# Patient Record
Sex: FEMALE | Race: WHITE | NOT HISPANIC OR LATINO | Employment: FULL TIME | URBAN - NONMETROPOLITAN AREA
[De-identification: names, ages, dates, MRNs, and addresses within clinical notes are randomized per-mention and may not be internally consistent; named-entity substitution may affect disease eponyms.]

---

## 2016-04-27 LAB — HIV 1&2 EXT: NORMAL

## 2018-12-19 ENCOUNTER — OFFICE VISIT (OUTPATIENT)
Dept: FAMILY MEDICINE | Facility: OTHER | Age: 38
End: 2018-12-19
Attending: FAMILY MEDICINE
Payer: COMMERCIAL

## 2018-12-19 VITALS
TEMPERATURE: 97.4 F | WEIGHT: 184.4 LBS | BODY MASS INDEX: 31.65 KG/M2 | DIASTOLIC BLOOD PRESSURE: 62 MMHG | SYSTOLIC BLOOD PRESSURE: 112 MMHG

## 2018-12-19 DIAGNOSIS — T14.8XXA WOUND INFECTION: Primary | ICD-10-CM

## 2018-12-19 DIAGNOSIS — L08.9 WOUND INFECTION: Primary | ICD-10-CM

## 2018-12-19 PROCEDURE — 99213 OFFICE O/P EST LOW 20 MIN: CPT | Performed by: FAMILY MEDICINE

## 2018-12-19 RX ORDER — CLINDAMYCIN HCL 300 MG
300 CAPSULE ORAL 4 TIMES DAILY
Qty: 40 CAPSULE | Refills: 0 | Status: SHIPPED | OUTPATIENT
Start: 2018-12-19 | End: 2018-12-29

## 2018-12-19 RX ORDER — CEPHALEXIN 500 MG/1
500 CAPSULE ORAL 2 TIMES DAILY
Qty: 20 CAPSULE | Refills: 0 | Status: SHIPPED | OUTPATIENT
Start: 2018-12-19

## 2018-12-19 RX ORDER — CEPHALEXIN 500 MG/1
500 CAPSULE ORAL 2 TIMES DAILY
Qty: 20 CAPSULE | Refills: 0 | Status: SHIPPED | OUTPATIENT
Start: 2018-12-19 | End: 2018-12-19

## 2018-12-19 NOTE — PROGRESS NOTES
SUBJECTIVE:   Swapna Kwong is a 38 year old female who presents to clinic today for the following health issues:    Patient is about 1 month out from surgery of her parotid gland, done at [facility].  Overall this is been healing very well.  But 4 days ago she started to notice some redness at the very inferior aspect of the incision.  She noticed some crustiness on the outside of the incision, her hair was sticking to this area.  Since then, she has been keeping it clean with hydrogen peroxide and making sure her hair stays out of this area.  No further drainage noted.  No fevers.  Redness is actually improved since that time.        Review of Systems see HPI, review of systems is otherwise negative.    OBJECTIVE:     /62 (BP Location: Right arm, Patient Position: Sitting, Cuff Size: Adult Regular)   Temp 97.4  F (36.3  C) (Tympanic)   Wt 83.6 kg (184 lb 6.4 oz)   BMI 31.65 kg/m    Body mass index is 31.65 kg/m .  Physical Exam   Constitutional: She appears well-developed and well-nourished.   HENT:   Right Ear: External ear normal.   Left Ear: External ear normal.   Overall, excellent appearance of incision behind right earlobe extending down inferior to her ear.  Very minimal amount of erythema at the very inferior portion of this incision.  There is firmness subcutaneously related to scar tissue, but no fluctuance.  No drainage.   Eyes: Conjunctivae are normal. No scleral icterus.   Cardiovascular: Normal rate.   Pulmonary/Chest: Effort normal. No respiratory distress.   Musculoskeletal: She exhibits no edema.   Neurological: She is alert.   Skin: No rash noted.   Psychiatric: She has a normal mood and affect.       ASSESSMENT/PLAN:         ICD-10-CM    1. Wound infection T14.8XXA clindamycin (CLEOCIN) 300 MG capsule    L08.9 cephALEXin (KEFLEX) 500 MG capsule     DISCONTINUED: cephALEXin (KEFLEX) 500 MG capsule     Suspect irritation/infection is actually healing on its own.   Patient will  continue to monitor for the next few days.  If redness increases, start keflex.   If symptoms worsen on Keflex, switch to Clinda due risk for MRSA. (printed prescription provided due to upcoming holiday).   Patient advised to have a low risk for returning for evaluation if symptoms persist or worsen. May need I&D, but no indication for that at this time.   She states she also has the option of virtual follow-up with [facility] if needed.    Sabi Fong MD  [facility]

## 2018-12-19 NOTE — NURSING NOTE
"Patient here for incision check. Had a surgical incision done on 12/15 at the [facility].  Incision is behind right ear, it was hurting, had drainage and was looking angry today it looks a bit better but wants to make sure.   Gissell Barnes LPN ..........12/19/2018 9:01 AM   Chief Complaint   Patient presents with     Wound Check     incision check       Initial /62 (BP Location: Right arm, Patient Position: Sitting, Cuff Size: Adult Regular)   Temp 97.4  F (36.3  C) (Tympanic)   Wt 83.6 kg (184 lb 6.4 oz)   BMI 31.65 kg/m   Estimated body mass index is 31.65 kg/m  as calculated from the following:    Height as of 11/10/16: 1.626 m (5' 4\").    Weight as of this encounter: 83.6 kg (184 lb 6.4 oz).  Medication Reconciliation: complete    Gissell Barnes LPN    "

## 2022-07-19 ENCOUNTER — APPOINTMENT (OUTPATIENT)
Dept: FAMILY MEDICINE | Facility: OTHER | Age: 42
End: 2022-07-19
Attending: CHIROPRACTOR

## 2022-07-19 PROCEDURE — 99199 UNLISTED SPECIAL SVC PX/RPRT: CPT | Performed by: CHIROPRACTOR

## 2023-07-14 ENCOUNTER — TRANSFERRED RECORDS (OUTPATIENT)
Dept: MULTI SPECIALTY CLINIC | Facility: CLINIC | Age: 43
End: 2023-07-14

## 2023-07-14 LAB
CHOLESTEROL (EXTERNAL): 228 MG/DL (ref 0–200)
HBA1C MFR BLD: 5 % (ref 4–5.6)
HDLC SERPL-MCNC: 58 MG/DL
LDL CHOLESTEROL CALCULATED (EXTERNAL): 153 MG/DL (ref 0–100)
NON HDL CHOLESTEROL (EXTERNAL): 170 MG/DL (ref 0–130)
TRIGLYCERIDES (EXTERNAL): 84 MG/DL

## 2024-03-24 ENCOUNTER — OFFICE VISIT (OUTPATIENT)
Dept: FAMILY MEDICINE | Facility: OTHER | Age: 44
End: 2024-03-24
Payer: COMMERCIAL

## 2024-03-24 VITALS
HEART RATE: 76 BPM | HEIGHT: 64 IN | TEMPERATURE: 97.3 F | RESPIRATION RATE: 20 BRPM | OXYGEN SATURATION: 99 % | SYSTOLIC BLOOD PRESSURE: 132 MMHG | DIASTOLIC BLOOD PRESSURE: 86 MMHG | BODY MASS INDEX: 29.88 KG/M2 | WEIGHT: 175 LBS

## 2024-03-24 DIAGNOSIS — H65.192 ACUTE MUCOID OTITIS MEDIA OF LEFT EAR: Primary | ICD-10-CM

## 2024-03-24 PROCEDURE — 99203 OFFICE O/P NEW LOW 30 MIN: CPT | Performed by: REGISTERED NURSE

## 2024-03-24 ASSESSMENT — PAIN SCALES - GENERAL: PAINLEVEL: MODERATE PAIN (5)

## 2024-03-24 NOTE — NURSING NOTE
"Chief Complaint   Patient presents with    Ear Problem     Started 6 days ago worsened 2 days ago     Patient not tx at this time    Initial /86 (BP Location: Right arm, Patient Position: Sitting, Cuff Size: Adult Regular)   Pulse 76   Temp 97.3  F (36.3  C) (Tympanic)   Resp 20   Ht 1.626 m (5' 4\")   Wt 79.4 kg (175 lb)   SpO2 99%   BMI 30.04 kg/m   Estimated body mass index is 30.04 kg/m  as calculated from the following:    Height as of this encounter: 1.626 m (5' 4\").    Weight as of this encounter: 79.4 kg (175 lb).     Advance Care Directive on file? no    FOOD SECURITY SCREENING QUESTIONS:    The next two questions are to help us understand your food security.  If you are feeling you need any assistance in this area, we have resources available to support you today.    Hunger Vital Signs:  Within the past 12 months we worried whether our food would run out before we got money to buy more. Never  Within the past 12 months the food we bought just didn't last and we didn't have money to get more. Never  Willow Bran LPN,YASMINE on 3/24/2024 at 9:13 AM      Willow Bran LPN     "

## 2024-03-24 NOTE — PROGRESS NOTES
"Swapna Kwong  1980    ASSESSMENT/PLAN:   1. Acute mucoid otitis media of left ear  6-day history of ear discomfort.  Friday pain worsened, today woke up with severe left ear pain.  Physical exam showing mucoid drainage throughout canal, unable to visualize TM.  Will treat with antibiotic therapy as indicated below for acute otitis media.    -Tylenol/ibuprofen as needed for pain or discomfort.  Complete antibiotic therapy and follow-up if symptoms worsen or fail to improve.    - amoxicillin-clavulanate (AUGMENTIN) 875-125 MG tablet; Take 1 tablet by mouth 2 times daily for 7 days  Dispense: 14 tablet; Refill: 0     Patient agrees with plan of care and verbalizes understating. AVS printed. Patient education provided verbally and written instructions provided as requested. Patient made aware of emergent signs and symptoms to monitor for and when to seek additional care/follow up.     SUBJECTIVE:   CHIEF COMPLAINT/ REASON FOR VISIT  Patient presents with:  Ear Problem: Started 6 days ago worsened 2 days ago     HISTORY OF PRESENT ILLNESS  Swapna Kwong is a pleasant 43 year old female [facility] with concerns of left ear pain.  Symptoms started on Monday, 6 days ago with slight pain and irritation.  She did attempt to irrigate her ear without success.  Has been using Q-tips throughout the week.  By Friday, she developed pain and this morning awoke with severe left ear pain.  Denies fever, chills or any recent URI like symptoms.  Has not noted any drainage.        I have reviewed the nursing notes.  I have reviewed allergies, medication list, problem list, and past medical history.    REVIEW OF SYSTEMS  See HPI    VITAL SIGNS  Vitals:    03/24/24 0909   BP: 132/86   BP Location: Right arm   Patient Position: Sitting   Cuff Size: Adult Regular   Pulse: 76   Resp: 20   Temp: 97.3  F (36.3  C)   TempSrc: Tympanic   SpO2: 99%   Weight: 79.4 kg (175 lb)   Height: 1.626 m (5' 4\")      Body mass " index is 30.04 kg/m .    OBJECTIVE:   PHYSICAL EXAM  Physical Exam  Constitutional:       Appearance: Normal appearance. She is not ill-appearing.   HENT:      Right Ear: Tympanic membrane normal.      Left Ear: Drainage (Mucoid) and tenderness present.   Neurological:      General: No focal deficit present.      Mental Status: She is alert.   Psychiatric:         Mood and Affect: Mood normal.          DIAGNOSTICS  No results found for any visits on 03/24/24.     EROS Zhu CNP  [facility]

## 2024-03-28 ENCOUNTER — OFFICE VISIT (OUTPATIENT)
Dept: FAMILY MEDICINE | Facility: OTHER | Age: 44
End: 2024-03-28
Attending: FAMILY MEDICINE
Payer: COMMERCIAL

## 2024-03-28 VITALS
RESPIRATION RATE: 16 BRPM | OXYGEN SATURATION: 99 % | SYSTOLIC BLOOD PRESSURE: 138 MMHG | BODY MASS INDEX: 29.98 KG/M2 | TEMPERATURE: 98.2 F | WEIGHT: 175.6 LBS | HEIGHT: 64 IN | HEART RATE: 76 BPM | DIASTOLIC BLOOD PRESSURE: 86 MMHG

## 2024-03-28 DIAGNOSIS — H65.192 ACUTE MUCOID OTITIS MEDIA OF LEFT EAR: Primary | ICD-10-CM

## 2024-03-28 PROCEDURE — 99213 OFFICE O/P EST LOW 20 MIN: CPT

## 2024-03-28 ASSESSMENT — PAIN SCALES - GENERAL: PAINLEVEL: MODERATE PAIN (4)

## 2024-03-28 NOTE — NURSING NOTE
"Chief Complaint   Patient presents with    RECHECK     Ongoing left ear pain   She was here on 03/24/24 for a left ear infection. She has 2 days of the antibiotic and her left ear is still pain and is draining more today.  Esmer Jones LPN..................3/28/2024   4:49 PM       Initial /86   Pulse 76   Temp 98.2  F (36.8  C) (Temporal)   Resp 16   Ht 1.626 m (5' 4\")   Wt 79.7 kg (175 lb 9.6 oz)   SpO2 99%   Breastfeeding No   BMI 30.14 kg/m   Estimated body mass index is 30.14 kg/m  as calculated from the following:    Height as of this encounter: 1.626 m (5' 4\").    Weight as of this encounter: 79.7 kg (175 lb 9.6 oz).  Medication Review: complete    The next two questions are to help us understand your food security.  If you are feeling you need any assistance in this area, we have resources available to support you today.           No data to display                  Health Care Directive:  Patient does not have a Health Care Directive or Living Will: Discussed advance care planning with patient; however, patient declined at this time.    Esmer Jones LPN      "

## 2024-03-28 NOTE — PROGRESS NOTES
ASSESSMENT/PLAN:    I have reviewed the nursing notes.  I have reviewed the findings, diagnosis, plan and need for follow up with the patient.    1. Acute mucoid otitis media of left ear    - Continue current antibiotic treatment as prescribed.    - Recommend taking a daily antihistamine such as Claritin, Zyrtec or Benadryl to help reduce fluid in ears.    - In a week to 10 days you may apply 10 drops of Debrox into left ear for 3 nights then return to clinic to have ears flushed due to excess cerumen in ear canal.    How can you care for yourself at home?  Take pain medicines exactly as directed.  If the doctor gave you a prescription medicine for pain, take it as prescribed.  If you are not taking a prescription pain medicine, take an over-the-counter medicine, such as acetaminophen (Tylenol), ibuprofen (Advil, Motrin), or naproxen (Aleve). Read and follow all instructions on the label.  Do not take two or more pain medicines at the same time unless the doctor told you to. Many pain medicines have acetaminophen, which is Tylenol. Too much acetaminophen (Tylenol) can be harmful.  Plan to take a full dose of pain reliever before bedtime. Getting enough sleep will help you get better.  Try a warm, moist washcloth on the ear. It may help relieve pain.  If your doctor prescribed antibiotics, take them as directed. Do not stop taking them just because you feel better. You need to take the full course of antibiotics.    - Discussed warning signs/symptoms indicative of need to f/u    - Follow up if symptoms persist or worsen or concerns    - I explained my diagnostic considerations and recommendations to the patient, who voiced understanding and agreement with the treatment plan. All questions were answered. We discussed potential side effects of any prescribed or recommended therapies, as well as expectations for response to treatments.    EROS Peoples CNP  3/28/2024  4:47 PM    HPI:    Swapna Kwong is a 43  "year old female who [facility] for concerns of persistent ear infection.  Patient was started on Augmentin on 3- for acute mucoid otitis media of the left ear.  States getting better but still concerned that infection is not going to be gone when antibiotic completed.  Patient states that on her pillow it appeared serous sanguinous fluid after sleeping but today noticed greenish colored chunky discharge from left ear.  Continues to feel itchy, feeling of fullness, hearing slightly impaired and still slightly painful.  Denies shortness of breath, chest pain, headache, lightheadedness, dizziness, sore throat, congestion, rhinorrhea, nausea, vomiting, diarrhea.    History reviewed. No pertinent past medical history.  History reviewed. No pertinent surgical history.  Social History     Tobacco Use    Smoking status: Never     Passive exposure: Past    Smokeless tobacco: Never   Substance Use Topics    Alcohol use: Not on file     Current Outpatient Medications   Medication Sig Dispense Refill    amoxicillin-clavulanate (AUGMENTIN) 875-125 MG tablet Take 1 tablet by mouth 2 times daily for 7 days 14 tablet 0    cephALEXin (KEFLEX) 500 MG capsule Take 1 capsule (500 mg) by mouth 2 times daily (Patient not taking: Reported on 3/24/2024) 20 capsule 0     No Known Allergies  Past medical history, past surgical history, current medications and allergies reviewed and accurate to the best of my knowledge.      ROS:  Refer to HPI    /86   Pulse 76   Temp 98.2  F (36.8  C) (Temporal)   Resp 16   Ht 1.626 m (5' 4\")   Wt 79.7 kg (175 lb 9.6 oz)   SpO2 99%   Breastfeeding No   BMI 30.14 kg/m      EXAM:  General Appearance: Well appearing 43 year old female, appropriate appearance for age. No acute distress   Ears: Left TM intact, translucent with bony landmarks appreciated, + erythema, + effusion, no bulging, + purulence.  Right TM intact, translucent with bony landmarks appreciated, no " erythema, no effusion, no bulging, no purulence.  Left auditory canal excess cerumen.  Right auditory canal clear.  Normal external ears, non tender.  Eyes: conjunctivae normal without erythema or irritation, corneas clear, no drainage or crusting, no eyelid swelling, pupils equal   Oropharynx: moist mucous membranes, posterior pharynx without erythema, tonsils symmetric, no erythema, no exudates or petechiae, no post nasal drip seen, no trismus, voice clear.    Sinuses:  No sinus tenderness upon palpation of the frontal or maxillary sinuses  Nose:  Bilateral nares: no erythema, no edema, no drainage or congestion   Neck: supple without adenopathy  Respiratory: normal chest wall and respirations.  Normal effort.  Clear to auscultation bilaterally, no wheezing, crackles or rhonchi.  No increased work of breathing.  No cough appreciated.  Cardiac: RRR with no murmurs  Abdomen: soft, nontender, no rigidity, no rebound tenderness or guarding, normal bowel sounds present   Musculoskeletal:  Equal movement of bilateral upper extremities.  Equal movement of bilateral lower extremities.  Normal gait.    Neuro: Alert and oriented to person, place, and time.    Psychological: normal affect, alert, oriented, and pleasant.

## 2024-09-21 ENCOUNTER — HEALTH MAINTENANCE LETTER (OUTPATIENT)
Age: 44
End: 2024-09-21

## 2025-01-12 ENCOUNTER — HEALTH MAINTENANCE LETTER (OUTPATIENT)
Age: 45
End: 2025-01-12